# Patient Record
Sex: MALE | Race: WHITE
[De-identification: names, ages, dates, MRNs, and addresses within clinical notes are randomized per-mention and may not be internally consistent; named-entity substitution may affect disease eponyms.]

---

## 2020-05-01 ENCOUNTER — HOSPITAL ENCOUNTER (EMERGENCY)
Dept: HOSPITAL 61 - ER | Age: 35
Discharge: HOME | End: 2020-05-01
Payer: COMMERCIAL

## 2020-05-01 VITALS — SYSTOLIC BLOOD PRESSURE: 119 MMHG | DIASTOLIC BLOOD PRESSURE: 68 MMHG

## 2020-05-01 VITALS — WEIGHT: 189.6 LBS | HEIGHT: 73 IN | BODY MASS INDEX: 25.13 KG/M2

## 2020-05-01 DIAGNOSIS — E10.9: ICD-10-CM

## 2020-05-01 DIAGNOSIS — R11.10: ICD-10-CM
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
## 2020-05-01 DIAGNOSIS — R41.82: ICD-10-CM

## 2020-05-01 DIAGNOSIS — F10.129: Primary | ICD-10-CM

## 2020-05-01 LAB
ANION GAP SERPL CALC-SCNC: 11 MMOL/L (ref 6–14)
APTT PPP: YELLOW S
BACTERIA #/AREA URNS HPF: 0 /HPF
BASOPHILS # BLD AUTO: 0 X10^3/UL (ref 0–0.2)
BASOPHILS NFR BLD: 0 % (ref 0–3)
BILIRUB UR QL STRIP: NEGATIVE
BUN SERPL-MCNC: 8 MG/DL (ref 8–26)
CALCIUM SERPL-MCNC: 8.3 MG/DL (ref 8.5–10.1)
CHLORIDE SERPL-SCNC: 104 MMOL/L (ref 98–107)
CO2 SERPL-SCNC: 25 MMOL/L (ref 21–32)
CREAT SERPL-MCNC: 0.8 MG/DL (ref 0.7–1.3)
EOSINOPHIL NFR BLD: 0.1 X10^3/UL (ref 0–0.7)
EOSINOPHIL NFR BLD: 1 % (ref 0–3)
ERYTHROCYTE [DISTWIDTH] IN BLOOD BY AUTOMATED COUNT: 13.3 % (ref 11.5–14.5)
FIBRINOGEN PPP-MCNC: CLEAR MG/DL
GFR SERPLBLD BASED ON 1.73 SQ M-ARVRAT: 111.3 ML/MIN
GLUCOSE SERPL-MCNC: 250 MG/DL (ref 70–99)
HCT VFR BLD CALC: 42.4 % (ref 39–53)
HGB BLD-MCNC: 14.4 G/DL (ref 13–17.5)
LYMPHOCYTES # BLD: 2.5 X10^3/UL (ref 1–4.8)
LYMPHOCYTES NFR BLD AUTO: 35 % (ref 24–48)
MCH RBC QN AUTO: 28 PG (ref 25–35)
MCHC RBC AUTO-ENTMCNC: 34 G/DL (ref 31–37)
MCV RBC AUTO: 83 FL (ref 79–100)
MONO #: 0.5 X10^3/UL (ref 0–1.1)
MONOCYTES NFR BLD: 7 % (ref 0–9)
NEUT #: 4.2 X10^3/UL (ref 1.8–7.7)
NEUTROPHILS NFR BLD AUTO: 58 % (ref 31–73)
NITRITE UR QL STRIP: NEGATIVE
PH UR STRIP: 6 [PH]
PLATELET # BLD AUTO: 243 X10^3/UL (ref 140–400)
POTASSIUM SERPL-SCNC: 3.5 MMOL/L (ref 3.5–5.1)
PROT UR STRIP-MCNC: NEGATIVE MG/DL
RBC # BLD AUTO: 5.13 X10^6/UL (ref 4.3–5.7)
RBC #/AREA URNS HPF: (no result) /HPF (ref 0–2)
SODIUM SERPL-SCNC: 140 MMOL/L (ref 136–145)
SQUAMOUS #/AREA URNS LPF: (no result) /LPF
UROBILINOGEN UR-MCNC: 0.2 MG/DL
WBC # BLD AUTO: 7.2 X10^3/UL (ref 4–11)
WBC #/AREA URNS HPF: (no result) /HPF (ref 0–4)

## 2020-05-01 PROCEDURE — 82962 GLUCOSE BLOOD TEST: CPT

## 2020-05-01 PROCEDURE — 36415 COLL VENOUS BLD VENIPUNCTURE: CPT

## 2020-05-01 PROCEDURE — 81001 URINALYSIS AUTO W/SCOPE: CPT

## 2020-05-01 PROCEDURE — 99285 EMERGENCY DEPT VISIT HI MDM: CPT

## 2020-05-01 PROCEDURE — 96374 THER/PROPH/DIAG INJ IV PUSH: CPT

## 2020-05-01 PROCEDURE — 80048 BASIC METABOLIC PNL TOTAL CA: CPT

## 2020-05-01 PROCEDURE — 85025 COMPLETE CBC W/AUTO DIFF WBC: CPT

## 2020-05-01 RX ADMIN — ONDANSETRON ONE MG: 2 INJECTION INTRAMUSCULAR; INTRAVENOUS at 00:45

## 2020-05-01 NOTE — PHYS DOC
Past Medical History


Past Medical History:  Diabetes-Type I, Seizure


Additional Past Medical Histor:  Sz x 2 due to hypoglycemia


Additional Past Surgical Histo:  vasectomy


Smoking Status:  Never Smoker


Alcohol Use:  Occasionally





General Adult


EDM:


Chief Complaint:  ALCOHOL INTOXICATION





HPI:


HPI:





Patient is a 35  year old male who presents with altered mental status. Patient 

unable to provide history due to lethargy and intoxication. According to wife 

and EMS, patient has been drinking all evening. He started throwing up and his 

wife noticed his glucose was elevated. She was concerned that he was going into 

DKA and called for an ambulance. Wife states he was feeling fine earlier today.





Review of Systems:


Review of Systems:


Unable to obtain due to intoxication





Heart Score:


Risk Factors:


Risk Factors:  DM, Current or recent (<one month) smoker, HTN, HLP, family 

history of CAD, obesity.


Risk Scores:


Score 0 - 3:  2.5% MACE over next 6 weeks - Discharge Home


Score 4 - 6:  20.3% MACE over next 6 weeks - Admit for Clinical Observation


Score 7 - 10:  72.7% MACE over next 6 weeks - Early Invasive Strategies





Current Medications:





Current Medications








 Medications


  (Trade)  Dose


 Ordered  Sig/Julio Cesar  Start Time


 Stop Time Status Last Admin


Dose Admin


 


 Ondansetron HCl


  (Zofran)  4 mg  1X  ONCE  5/1/20 00:45


 5/1/20 00:46 DC  














Allergies:


Allergies:





Allergies








Coded Allergies Type Severity Reaction Last Updated Verified


 


  Unable to Assess    5/1/20 No











Physical Exam:


PE:


Constitutional: Well developed, well nourished, lethargic


HEENT: Normocephalic, atraumatic, oropharynx moist, EOMI, PERRL, no drainage 

from eyes, normal conjunctiva


Neck: Supple, normal range of motion, no stridor


Cardiovascular: tachycardic, 2+ radial pulses bilaterally, no edema


Respiratory: CTA bilaterally, no respiratory distress, no wheezing/crackles


Abdomen: Soft, nontender, nondistended, no masses


Skin: Warm, dry, intact


Extremities: No obvious deformities


Neurologic: Lethargic, mumbles, withdrawals to pain


Psychologic: intoxicated





Current Patient Data:


Labs:





                                Laboratory Tests








Test


 5/1/20


00:35 5/1/20


00:36 5/1/20


00:45


 


White Blood Count


 7.2 x10^3/uL


(4.0-11.0) 


 





 


Red Blood Count


 5.13 x10^6/uL


(4.30-5.70) 


 





 


Hemoglobin


 14.4 g/dL


(13.0-17.5) 


 





 


Hematocrit


 42.4 %


(39.0-53.0) 


 





 


Mean Corpuscular Volume


 83 fL ()


 


 





 


Mean Corpuscular Hemoglobin 28 pg (25-35)    


 


Mean Corpuscular Hemoglobin


Concent 34 g/dL


(31-37) 


 





 


Red Cell Distribution Width


 13.3 %


(11.5-14.5) 


 





 


Platelet Count


 243 x10^3/uL


(140-400) 


 





 


Neutrophils (%) (Auto) 58 % (31-73)    


 


Lymphocytes (%) (Auto) 35 % (24-48)    


 


Monocytes (%) (Auto) 7 % (0-9)    


 


Eosinophils (%) (Auto) 1 % (0-3)    


 


Basophils (%) (Auto) 0 % (0-3)    


 


Neutrophils # (Auto)


 4.2 x10^3/uL


(1.8-7.7) 


 





 


Lymphocytes # (Auto)


 2.5 x10^3/uL


(1.0-4.8) 


 





 


Monocytes # (Auto)


 0.5 x10^3/uL


(0.0-1.1) 


 





 


Eosinophils # (Auto)


 0.1 x10^3/uL


(0.0-0.7) 


 





 


Basophils # (Auto)


 0.0 x10^3/uL


(0.0-0.2) 


 





 


Sodium Level


 140 mmol/L


(136-145) 


 





 


Potassium Level


 3.5 mmol/L


(3.5-5.1) 


 





 


Chloride Level


 104 mmol/L


() 


 





 


Carbon Dioxide Level


 25 mmol/L


(21-32) 


 





 


Anion Gap 11 (6-14)    


 


Blood Urea Nitrogen


 8 mg/dL (8-26)


 


 





 


Creatinine


 0.8 mg/dL


(0.7-1.3) 


 





 


Estimated GFR


(Cockcroft-Gault) 111.3  


 


 





 


Glucose Level


 250 mg/dL


(70-99)  H 


 





 


Calcium Level


 8.3 mg/dL


(8.5-10.1)  L 


 





 


Ethyl Alcohol Level


 192 mg/dL


(0-10)  H 


 





 


Glucose (Fingerstick)


 


 231 mg/dL


(70-99)  H 





 


Urine Collection Type   U cath  


 


Urine Color   Yellow  


 


Urine Clarity   Clear  


 


Urine pH


 


 


 6.0 (<5.0-8.0)





 


Urine Specific Gravity


 


 


 1.015


(1.000-1.030)


 


Urine Protein


 


 


 Negative mg/dL


(NEG-TRACE)


 


Urine Glucose (UA)


 


 


 >=1000 mg/dL


(NEG)


 


Urine Ketones (Stick)


 


 


 Negative mg/dL


(NEG)


 


Urine Blood


 


 


 Negative (NEG)





 


Urine Nitrite


 


 


 Negative (NEG)





 


Urine Bilirubin


 


 


 Negative (NEG)





 


Urine Urobilinogen Dipstick


 


 


 0.2 mg/dL (0.2


mg/dL)


 


Urine Leukocyte Esterase


 


 


 Negative (NEG)





 


Urine RBC


 


 


 3-5 /HPF (0-2)





 


Urine WBC


 


 


 1-4 /HPF (0-4)





 


Urine Squamous Epithelial


Cells 


 


 Few /LPF  





 


Urine Bacteria


 


 


 0 /HPF (0-FEW)





 


Urine Mucus   Slight /LPF  





                                Laboratory Tests


5/1/20 00:35








                                Laboratory Tests


5/1/20 00:35








Vital Signs:





                                   Vital Signs








  Date Time  Temp Pulse Resp B/P (MAP) Pulse Ox O2 Delivery O2 Flow Rate FiO2


 


5/1/20 00:20 98.0 65 20 129/79 (96) 95 Room Air  





 98.0       











EKG:


EKG:


[]





Radiology/Procedures:


Radiology/Procedures:


[]


Impression:


Alcohol intoxication





Course & Med Decision Making:


Course & Med Decision Making


Pertinent Labs and Imaging studies reviewed. (See chart for details)





Patient is a 35 year old male with a history of DM who presents to the Emergency

 Room intoxicated. Patient is lethargic, but pulls away from pain and mumbles. 

At this time, he does not need to be intubated. Wife was concerned about DKA. 

CBC, BMP, UA, ETOH level ordered.


Patient does not appear to be in DKA at this time. He will be observed until 

sober.  Patient's test results and vitals while in the ED were fully reviewed 

and discussed with the patient. Patient is stable and at this time does not need

 admission to the hospital. We have discussed strict return precautions and the 

importance of following up with their Primary Care Physician. Patient stated 

understanding and was given an opportunity to ask any questions.





Dragon Disclaimer:


Dragon Disclaimer:


This electronic medical record was generated, in whole or in part, using a voice

 recognition dictation system.





Departure


Departure


Impression:  


   Primary Impression:  


   Alcohol intoxication


   Additional Impressions:  


   Vomiting


   Diabetes


Disposition:  01 HOME, SELF-CARE


Condition:  IMPROVED











NICOLASA LOJA MD               May 1, 2020 01:40